# Patient Record
Sex: FEMALE | Race: WHITE | NOT HISPANIC OR LATINO | URBAN - METROPOLITAN AREA
[De-identification: names, ages, dates, MRNs, and addresses within clinical notes are randomized per-mention and may not be internally consistent; named-entity substitution may affect disease eponyms.]

---

## 2019-07-23 ENCOUNTER — OFFICE VISIT (OUTPATIENT)
Dept: FAMILY MEDICINE | Age: 44
End: 2019-07-23

## 2019-07-23 VITALS
SYSTOLIC BLOOD PRESSURE: 114 MMHG | HEART RATE: 81 BPM | DIASTOLIC BLOOD PRESSURE: 70 MMHG | BODY MASS INDEX: 19.81 KG/M2 | HEIGHT: 64 IN | RESPIRATION RATE: 16 BRPM | TEMPERATURE: 98 F | WEIGHT: 116 LBS | OXYGEN SATURATION: 98 %

## 2019-07-23 DIAGNOSIS — L25.5 PLANT DERMATITIS: Primary | ICD-10-CM

## 2019-07-23 PROCEDURE — 99202 OFFICE O/P NEW SF 15 MIN: CPT | Performed by: PHYSICIAN ASSISTANT

## 2019-07-23 RX ORDER — TRIAMCINOLONE ACETONIDE 1 MG/G
CREAM TOPICAL 2 TIMES DAILY
Qty: 15 G | Refills: 1 | Status: SHIPPED | OUTPATIENT
Start: 2019-07-23

## 2019-07-23 RX ORDER — METHYLPREDNISOLONE 4 MG/1
4 TABLET ORAL SEE ADMIN INSTRUCTIONS
Qty: 21 TABLET | Refills: 0 | Status: SHIPPED | OUTPATIENT
Start: 2019-07-23 | End: 2019-08-03 | Stop reason: ALTCHOICE

## 2019-07-23 ASSESSMENT — PATIENT HEALTH QUESTIONNAIRE - PHQ9
2. FEELING DOWN, DEPRESSED OR HOPELESS: NOT AT ALL
SUM OF ALL RESPONSES TO PHQ9 QUESTIONS 1 AND 2: 0
1. LITTLE INTEREST OR PLEASURE IN DOING THINGS: NOT AT ALL
SUM OF ALL RESPONSES TO PHQ9 QUESTIONS 1 AND 2: 0

## 2019-07-26 ASSESSMENT — ENCOUNTER SYMPTOMS
CONSTITUTIONAL NEGATIVE: 1
RESPIRATORY NEGATIVE: 1

## 2019-08-03 ENCOUNTER — OFFICE VISIT (OUTPATIENT)
Dept: FAMILY MEDICINE | Age: 44
End: 2019-08-03

## 2019-08-03 VITALS
RESPIRATION RATE: 16 BRPM | TEMPERATURE: 98.4 F | HEIGHT: 64 IN | BODY MASS INDEX: 19.29 KG/M2 | WEIGHT: 113 LBS | SYSTOLIC BLOOD PRESSURE: 102 MMHG | HEART RATE: 63 BPM | DIASTOLIC BLOOD PRESSURE: 64 MMHG | OXYGEN SATURATION: 97 %

## 2019-08-03 DIAGNOSIS — L23.7 ALLERGIC CONTACT DERMATITIS DUE TO PLANTS, EXCEPT FOOD: Primary | ICD-10-CM

## 2019-08-03 PROCEDURE — 99214 OFFICE O/P EST MOD 30 MIN: CPT | Performed by: INTERNAL MEDICINE

## 2019-08-03 RX ORDER — PREDNISONE 20 MG/1
TABLET ORAL
Qty: 42 TABLET | Refills: 0 | Status: SHIPPED | OUTPATIENT
Start: 2019-08-03

## 2019-08-03 ASSESSMENT — ENCOUNTER SYMPTOMS
DIZZINESS: 0
FATIGUE: 0
UNEXPECTED WEIGHT CHANGE: 0
CHILLS: 0
NUMBNESS: 0
COUGH: 0
WEAKNESS: 0
NERVOUS/ANXIOUS: 0
COLOR CHANGE: 0
ABDOMINAL PAIN: 0
WOUND: 0
HEADACHES: 0
FEVER: 0
DIARRHEA: 0
EYE ITCHING: 0
STRIDOR: 0
EYE REDNESS: 0
SINUS PRESSURE: 0
VOMITING: 0
WHEEZING: 0
BLOOD IN STOOL: 0
SHORTNESS OF BREATH: 0
CONSTIPATION: 0
RHINORRHEA: 0

## 2020-08-02 ENCOUNTER — HOSPITAL ENCOUNTER (OUTPATIENT)
Age: 45
Discharge: HOME | End: 2020-08-02
Payer: COMMERCIAL

## 2020-08-02 VITALS
SYSTOLIC BLOOD PRESSURE: 116 MMHG | HEART RATE: 75 BPM | DIASTOLIC BLOOD PRESSURE: 65 MMHG | OXYGEN SATURATION: 100 % | RESPIRATION RATE: 18 BRPM | TEMPERATURE: 98 F

## 2020-08-02 DIAGNOSIS — B00.9 HSV INFECTION: Primary | ICD-10-CM

## 2020-08-02 PROCEDURE — 87529 HSV DNA AMP PROBE: CPT | Performed by: PHYSICIAN ASSISTANT

## 2020-08-02 PROCEDURE — 99203 OFFICE O/P NEW LOW 30 MIN: CPT | Performed by: PHYSICIAN ASSISTANT

## 2020-08-02 RX ORDER — ACYCLOVIR 400 MG/1
400 TABLET ORAL 3 TIMES DAILY
Qty: 21 TABLET | Refills: 0 | Status: SHIPPED | OUTPATIENT
Start: 2020-08-02 | End: 2020-08-09

## 2020-08-02 NOTE — ED PROVIDER NOTES
Patient Seen in: [id number] [facility]      History   Patient presents with:  Nose Problem    Stated Complaint: White Bumps in Nose    HPI    Very pleasant 82-year-old female. No significant medical history. No regular medications.   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   She now remembers that she did have a itching and burning sensation at the left corner of her mouth recentl

## 2020-08-03 LAB
HSV1 DNA SPEC QL NAA+PROBE: NEGATIVE
HSV2 DNA SPEC QL NAA+PROBE: NEGATIVE

## 2024-03-01 ENCOUNTER — APPOINTMENT (OUTPATIENT)
Dept: GENERAL RADIOLOGY | Age: 49
End: 2024-03-01
Attending: EMERGENCY MEDICINE
Payer: COMMERCIAL

## 2024-03-01 ENCOUNTER — HOSPITAL ENCOUNTER (EMERGENCY)
Age: 49
Discharge: HOME | End: 2024-03-01
Attending: EMERGENCY MEDICINE
Payer: COMMERCIAL

## 2024-03-01 VITALS
HEIGHT: 63 IN | OXYGEN SATURATION: 100 % | DIASTOLIC BLOOD PRESSURE: 87 MMHG | SYSTOLIC BLOOD PRESSURE: 141 MMHG | RESPIRATION RATE: 16 BRPM | HEART RATE: 79 BPM | BODY MASS INDEX: 20.37 KG/M2 | WEIGHT: 115 LBS | TEMPERATURE: 98 F

## 2024-03-01 DIAGNOSIS — S93.401A SPRAIN OF RIGHT ANKLE, UNSPECIFIED LIGAMENT, INITIAL ENCOUNTER: Primary | ICD-10-CM

## 2024-03-01 PROCEDURE — 73610 X-RAY EXAM OF ANKLE: CPT | Performed by: EMERGENCY MEDICINE

## 2024-03-01 PROCEDURE — 99284 EMERGENCY DEPT VISIT MOD MDM: CPT

## 2024-03-01 PROCEDURE — 99283 EMERGENCY DEPT VISIT LOW MDM: CPT

## 2024-03-01 NOTE — ED PROVIDER NOTES
Patient Seen in: [facility] In [place]      History     Chief Complaint   Patient presents with    Leg or Foot Injury     Stated Complaint: Right ankle pain after twisting it going down the stairs    Subjective:   HPI    This is a very pleasant 48-year-old female that presents with right ankle pain after twisting it while on the stairs.  Patient states she was standing on the top stair down into the basement yelling at her son and when he did not respond she stepped down closer and twisted her right ankle on the step.  She did not fall.  She states she heard a snapping sound in her ankle.  She is unable able to weight-bear without pain.  No other injuries.  She is not on anticoagulants.  She took nothing for pain.  She presents here for further evaluation.    Objective:   Past Medical History:   Diagnosis Date    Pre-eclampsia (HCC)               History reviewed. No pertinent surgical history.             Social History     Socioeconomic History    Marital status:    Tobacco Use    Smoking status: Never    Smokeless tobacco: Never   Vaping Use    Vaping Use: Never used   Substance and Sexual Activity    Alcohol use: Yes    Drug use: Never              Review of Systems    Positive for stated complaint: Right ankle pain after twisting it going down the stairs  Other systems are as noted in HPI.  Constitutional and vital signs reviewed.      All other systems reviewed and negative except as noted above.    Physical Exam     ED Triage Vitals [03/01/24 1459]   /87   Pulse 79   Resp 16   Temp 97.8 °F (36.6 °C)   Temp src Temporal   SpO2 100 %   O2 Device None (Room air)       Current:/87   Pulse 79   Temp 97.8 °F (36.6 °C) (Temporal)   Resp 16   Ht 160 cm (5' 3\")   Wt 52.2 kg   LMP 02/23/2024 (Approximate)   SpO2 100%   BMI 20.37 kg/m²         Physical Exam    Extremity: Swelling noted over the right lateral malleolus.  No gross deformity.  No pain over the fifth  metatarsal, proximal fibula or knee.  She has good range of motion of her knee and hip.  Sensation intact.  +2/4 DP PT pulses.    ED Course   Labs Reviewed - No data to display          XR ANKLE (MIN 3 VIEWS), RIGHT (CPT=73610)    Result Date: 3/1/2024  PROCEDURE:  XR ANKLE (MIN 3 VIEWS), RIGHT (CPT=73610)  TECHNIQUE:  Three views were obtained.  COMPARISON:  None.  INDICATIONS:  Right ankle pain after twisting it going down the stairs  PATIENT STATED HISTORY: (As transcribed by Technologist)  Patient states that she twisted her right ankle while walking down the stairs earlier today. Patient complains of pain along lateral aspect of right ankle and the pain worsens when she attempts to  dorsiflex her ankle.    FINDINGS:  No fracture, dislocation or osseous abnormality.            CONCLUSION:  No abnormality demonstrated in the right ankle.     LOCATION:  [facility]   Dictated by (CST): El Clarke MD on 3/01/2024 at 3:22 PM     Finalized by (CST): El Clarke MD on 3/01/2024 at 3:22 PM              MDM          This is a 48 old female presents with right ankle pain status post injury.  Differential include sprain/strain versus fracture.    Patient declined any ibuprofen for pain.    Independently reviewed the right ankle x-ray which demonstrated no evidence of bony fracture.  Also reviewed the radiology interpretation as above.      Patient has a right ankle sprain.  Stirrup splint was provided.  She has crutches at home.  Increase weightbearing as tolerated.  Ibuprofen for pain.  Elevate, ice.  Follow-up with orthopedics.  Return for any problems.  Patient discharged home in good condition.                        Disposition and Plan     Clinical Impression:  1. Sprain of right ankle, unspecified ligament, initial encounter         Disposition:  Discharge  3/1/2024  3:27 pm    Follow-up:  Kenrick Emerson MD  [facility] [address]  [address]  [facility] [address] [phone]    Follow up in 1 week(s)  As  needed          Medications Prescribed:  Current Discharge Medication List

## 2024-03-01 NOTE — DISCHARGE INSTRUCTIONS
Use crutches increase weightbearing as tolerated  Ibuprofen for 400 mg 3 times a day with food for pain for 3 to 5 days  Elevate while at rest  Cold compress topically 20 minutes every 2 hours while awake  Follow-up with orthopedics 1 week, sooner if not improving